# Patient Record
Sex: FEMALE | Race: WHITE | NOT HISPANIC OR LATINO | URBAN - METROPOLITAN AREA
[De-identification: names, ages, dates, MRNs, and addresses within clinical notes are randomized per-mention and may not be internally consistent; named-entity substitution may affect disease eponyms.]

---
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
## 2021-12-20 ENCOUNTER — HOSPITAL ENCOUNTER (OUTPATIENT)
Facility: CLINIC | Age: 21
Discharge: HOME | End: 2021-12-20
Attending: INTERNAL MEDICINE
Payer: COMMERCIAL

## 2021-12-20 VITALS
OXYGEN SATURATION: 96 % | RESPIRATION RATE: 17 BRPM | SYSTOLIC BLOOD PRESSURE: 100 MMHG | TEMPERATURE: 100.4 F | DIASTOLIC BLOOD PRESSURE: 60 MMHG | HEART RATE: 142 BPM

## 2021-12-20 DIAGNOSIS — J03.90 TONSILLITIS: Primary | ICD-10-CM

## 2021-12-20 LAB
EXPIRATION DATE: NORMAL
EXPIRATION DATE: NORMAL
HETEROPH AB SER QL LA: NEGATIVE
Lab: NORMAL
Lab: NORMAL
POCT MANUFACTURER: NORMAL
POCT MANUFACTURER: NORMAL
RAPID INFLUENZA A AGN: NORMAL
RAPID INFLUENZA B AGN: NORMAL
S PYO AG THROAT QL: NEGATIVE

## 2021-12-20 PROCEDURE — 99203 OFFICE O/P NEW LOW 30 MIN: CPT | Performed by: INTERNAL MEDICINE

## 2021-12-20 PROCEDURE — 86308 HETEROPHILE ANTIBODY SCREEN: CPT | Performed by: INTERNAL MEDICINE

## 2021-12-20 PROCEDURE — 87880 STREP A ASSAY W/OPTIC: CPT | Performed by: INTERNAL MEDICINE

## 2021-12-20 PROCEDURE — S9083 URGENT CARE CENTER GLOBAL: HCPCS | Performed by: INTERNAL MEDICINE

## 2021-12-20 PROCEDURE — 87804 INFLUENZA ASSAY W/OPTIC: CPT | Performed by: INTERNAL MEDICINE

## 2021-12-20 RX ORDER — CEFUROXIME AXETIL 250 MG/1
250 TABLET ORAL 2 TIMES DAILY
Qty: 14 TABLET | Refills: 0 | Status: SHIPPED | OUTPATIENT
Start: 2021-12-20 | End: 2021-12-27

## 2021-12-20 RX ORDER — CEFUROXIME AXETIL 250 MG/1
250 TABLET ORAL 2 TIMES DAILY
Qty: 14 TABLET | Refills: 0 | Status: SHIPPED | OUTPATIENT
Start: 2021-12-20 | End: 2021-12-20 | Stop reason: SDUPTHER

## 2021-12-20 RX ORDER — PREDNISONE 20 MG/1
TABLET ORAL
Qty: 12 TABLET | Refills: 0 | Status: SHIPPED | OUTPATIENT
Start: 2021-12-20

## 2021-12-20 RX ORDER — PREDNISONE 20 MG/1
TABLET ORAL
Qty: 12 TABLET | Refills: 0 | Status: SHIPPED | OUTPATIENT
Start: 2021-12-20 | End: 2021-12-20 | Stop reason: SDUPTHER

## 2021-12-20 ASSESSMENT — ENCOUNTER SYMPTOMS
ABDOMINAL PAIN: 0
SORE THROAT: 1
RHINORRHEA: 0
HEADACHES: 1
CHILLS: 0
VOMITING: 0
SHORTNESS OF BREATH: 0
DIARRHEA: 0
EYE DISCHARGE: 0
COUGH: 1
FEVER: 0

## 2021-12-20 NOTE — DISCHARGE INSTRUCTIONS
Clinically you have a tonsillitis.  The rapid strep and rapid mono were both negative.  Also a negative flu swab.  I am putting you on a nonpenicillin antibiotic for a secondary infection.  Despite the negative rapid mono I cannot completely rule this out given the fact that her symptoms have been only going on for 5 days.  Additionally due to the inflammation of starting on prednisone and wanted to take 2 tablets tonight then 1 twice daily by mouth for the next 5 days.  Medications were sent electronically to [facility].  Warm salt water gargles using 1 teaspoon of salt in 8 ounces of water 2-3 times a day along with lozenges such as Cepacol may provide some relief as well.  Make sure that you are staying hydrated with sips of fluids every 20 minutes while awake.  Follow-up with Dr. Jayesh Jameson, ear nose and throat at [facility] as recommended if symptoms do not improve dramatically over the next 48 hours.  The office #[phone].  Please let the office staff know you are seen in the [facility] urgent care and [facility] and will refer to them for follow-up.

## 2021-12-20 NOTE — ED PROVIDER NOTES
History  Chief Complaint   Patient presents with   • Sore Throat   • Headache   • Light Rash Abdominal Area   • Bilateral Ear Pain     NKDA  Covid vaccinated x 2 (P)          History provided by:  Patient   used: No    Sore Throat  Quality:  Sore  Severity:  Moderate  Duration:  5 days  Chronicity:  New  Relieved by:  OTC medications (nyquil)  Worsened by:  Swallowing  Associated symptoms: cough, ear pain, headaches and rash    Associated symptoms: no abdominal pain, no chest pain, no chills, no ear discharge, no epistaxis, no eye discharge, no fever, no rhinorrhea and no shortness of breath    Risk factors: no exposure to strep and no exposure to mono    Headache  Associated symptoms: cough, ear pain and sore throat    Associated symptoms: no abdominal pain, no diarrhea, no fever and no vomiting        History reviewed. No pertinent past medical history.    Past Surgical History:   Procedure Laterality Date   • TONSILLECTOMY         History reviewed. No pertinent family history.    Social History     Tobacco Use   • Smoking status: Never Smoker   • Smokeless tobacco: Never Used   Substance Use Topics   • Alcohol use: Yes   • Drug use: Yes     Types: Marijuana       Review of Systems   Constitutional: Negative for chills and fever.   HENT: Positive for ear pain and sore throat. Negative for ear discharge, nosebleeds and rhinorrhea.    Eyes: Negative for discharge.   Respiratory: Positive for cough. Negative for shortness of breath.    Cardiovascular: Negative for chest pain.   Gastrointestinal: Negative for abdominal pain, diarrhea and vomiting.   Skin: Positive for rash.   Neurological: Positive for headaches.       Physical Exam  ED Triage Vitals [12/20/21 1713]   Temp Heart Rate Resp BP SpO2   38 °C (100.4 °F) (!) 142 17 100/60 96 %      Temp Source Heart Rate Source Patient Position BP Location FiO2 (%) (Set)   Oral Monitor Sitting Right upper arm --       Physical Exam  Vitals reviewed.    Constitutional:       Appearance: She is well-developed. She is not ill-appearing, toxic-appearing or diaphoretic.   HENT:      Right Ear: Tympanic membrane normal. Tympanic membrane is not erythematous.      Left Ear: Tympanic membrane normal. Tympanic membrane is not erythematous.      Mouth/Throat:      Mouth: Mucous membranes are moist. No oral lesions.      Pharynx: Oropharyngeal exudate and posterior oropharyngeal erythema present.      Tonsils: Tonsillar exudate present. No tonsillar abscesses. 2+ on the right. 2+ on the left.      Comments: Thick voice  Cardiovascular:      Rate and Rhythm: Regular rhythm. Tachycardia present.      Heart sounds: No murmur heard.     Comments:   Pulmonary:      Effort: Pulmonary effort is normal. No respiratory distress.      Breath sounds: No wheezing or rales.   Abdominal:      Palpations: Abdomen is soft.      Tenderness: There is no abdominal tenderness.   Musculoskeletal:      Cervical back: Neck supple.   Lymphadenopathy:      Cervical: Cervical adenopathy present.   Skin:     General: Skin is warm.      Findings: Erythema and rash present.      Comments: Small rashy area over abdomen (erythema)   Neurological:      Mental Status: She is alert.   Psychiatric:         Mood and Affect: Mood normal.         Behavior: Behavior normal.           Procedures  Procedures    UC Course  Clinical Impressions as of 12/20/21 2014   Tonsillitis       MDM  Number of Diagnoses or Management Options  Diagnosis management comments: Flu, strep, mono all negative.  Covid vaccinated.  Exam consistent with tonsillitis.  Nonpenicillin antibiotics and prednisone.  Warm salt water gargles.  Refer to ENT.  Please see disposition for full care plan.       Amount and/or Complexity of Data Reviewed  Clinical lab tests: ordered and reviewed                   Michael Reynolds,   12/20/21 1818       Michael Reynolds,   12/20/21 2014